# Patient Record
Sex: MALE | Race: WHITE | NOT HISPANIC OR LATINO | URBAN - METROPOLITAN AREA
[De-identification: names, ages, dates, MRNs, and addresses within clinical notes are randomized per-mention and may not be internally consistent; named-entity substitution may affect disease eponyms.]

---

## 2017-01-01 ENCOUNTER — HOME CARE/HOSPICE - HEALTHEAST (OUTPATIENT)
Dept: HOSPICE | Facility: HOSPICE | Age: 82
End: 2017-01-01

## 2017-01-01 ENCOUNTER — OFFICE VISIT - HEALTHEAST (OUTPATIENT)
Dept: GERIATRICS | Facility: CLINIC | Age: 82
End: 2017-01-01

## 2017-01-01 ENCOUNTER — AMBULATORY - HEALTHEAST (OUTPATIENT)
Dept: HOSPICE | Facility: HOSPICE | Age: 82
End: 2017-01-01

## 2017-01-01 ENCOUNTER — AMBULATORY - HEALTHEAST (OUTPATIENT)
Dept: FAMILY MEDICINE | Facility: CLINIC | Age: 82
End: 2017-01-01

## 2017-01-01 DIAGNOSIS — C34.12 MALIGNANT NEOPLASM OF UPPER LOBE OF LEFT LUNG (H): ICD-10-CM

## 2017-01-01 DIAGNOSIS — G30.9 MIXED ALZHEIMER'S AND VASCULAR DEMENTIA (H): ICD-10-CM

## 2017-01-01 DIAGNOSIS — G30.9 ALZHEIMER'S DISEASE (H): ICD-10-CM

## 2017-01-01 DIAGNOSIS — C43.70: ICD-10-CM

## 2017-01-01 DIAGNOSIS — I10 ESSENTIAL HYPERTENSION: ICD-10-CM

## 2017-01-01 DIAGNOSIS — I15.0 RENOVASCULAR HYPERTENSION WITH GOAL BLOOD PRESSURE LESS THAN 140/90: ICD-10-CM

## 2017-01-01 DIAGNOSIS — Z51.5 HOSPICE CARE PATIENT: ICD-10-CM

## 2017-01-01 DIAGNOSIS — F02.80 ALZHEIMER'S DISEASE (H): ICD-10-CM

## 2017-01-01 DIAGNOSIS — F02.80 MIXED ALZHEIMER'S AND VASCULAR DEMENTIA (H): ICD-10-CM

## 2017-01-01 DIAGNOSIS — K21.9 GASTROESOPHAGEAL REFLUX DISEASE WITHOUT ESOPHAGITIS: ICD-10-CM

## 2017-01-01 DIAGNOSIS — I67.9 CEREBROVASCULAR DISEASE: ICD-10-CM

## 2017-01-01 DIAGNOSIS — H40.1190 PRIMARY OPEN ANGLE GLAUCOMA, UNSPECIFIED GLAUCOMA STAGE, UNSPECIFIED LATERALITY: ICD-10-CM

## 2017-01-01 DIAGNOSIS — G81.90 HEMIPLEGIA (H): ICD-10-CM

## 2017-01-01 DIAGNOSIS — F01.50 MIXED ALZHEIMER'S AND VASCULAR DEMENTIA (H): ICD-10-CM

## 2017-01-01 DIAGNOSIS — E55.9 VITAMIN D DEFICIENCY: ICD-10-CM

## 2017-01-01 DIAGNOSIS — C61 MALIGNANT NEOPLASM OF PROSTATE (H): ICD-10-CM

## 2017-01-01 DIAGNOSIS — H35.30 MACULAR DEGENERATION (SENILE) OF RETINA: ICD-10-CM

## 2017-04-06 ENCOUNTER — HOME CARE/HOSPICE - HEALTHEAST (OUTPATIENT)
Dept: HOSPICE | Facility: HOSPICE | Age: 82
End: 2017-04-06

## 2017-04-06 ENCOUNTER — AMBULATORY - HEALTHEAST (OUTPATIENT)
Dept: GERIATRICS | Facility: CLINIC | Age: 82
End: 2017-04-06

## 2021-05-30 VITALS — WEIGHT: 194 LBS | BODY MASS INDEX: 27.84 KG/M2

## 2021-05-30 VITALS — WEIGHT: 192 LBS | BODY MASS INDEX: 27.55 KG/M2

## 2021-05-30 VITALS — WEIGHT: 189 LBS | BODY MASS INDEX: 27.12 KG/M2

## 2021-05-30 VITALS — WEIGHT: 164 LBS | BODY MASS INDEX: 23.53 KG/M2

## 2021-06-08 NOTE — PROGRESS NOTES
[facility]    Facility:   [facility] [place] [[id number]]   Code Status: DNR/DNI      CHIEF COMPLAINT/REASON FOR VISIT:  Chief Complaint   Patient presents with     Review Of Multiple Medical Conditions       HISTORY:      HPI: Gustavo is a 89 y.o. male seen today for a routine follow up and address any concerns he or nursing may have. He is currently on hospice care. I met with him in his room. He had just come back from breakfast. He was pleasant and alert to self only. He denied any pain or SOB. It appears he os on scheduled tylenol, vicodin and dilaudid.  He was last seen by MD in October in which he was noted to have a prominent swelling at his right side lower neck extending slightly to his upper chest. The area was red and somewhat warm. Today there is no swelling or redness. When I tell patient I met him at this time he does not remember me or anything about the facial swelling.  He had no concerns and neither did nursing.    Past Medical History   Diagnosis Date     Abnormality of gait 10/9/2012     Overview:  Had a fall in Spring 2011 while living in [place], [place] and sustained a huge subdural hematoma L side/ closed head injury. Since that time patient was dx'd with dementia and has very poor STM. Subsequently developed pneumonia and had a series of TIA's.      Alzheimer's disease 10/9/2012     Atherosclerosis of renal artery 10/9/2012     Overview:  L stent placed 1/2009.       Cerebrovascular disease 10/9/2012     Overview:  Series of TIA's after his traumatic fall and subdural hematoma/ closed head injury in 2011.       Chronic kidney disease, stage III (moderate) 6/13/2014     DNR (do not resuscitate) 10/3/2012     Overview:  DNR/DNI. NP reviewed with dtr Ruth/healthcare POA on 5/13/14.       Esophageal reflux 2/11/2015     Essential hypertension 10/9/2012     Hearing loss 10/9/2012     Hemiplegia 10/9/2012     Overview:  Subtle with TIA.      Macular degeneration  (senile) of retina 10/10/2014     Malignant melanoma of skin of lower limb, including hip 10/9/2012     Overview:  Removed on anterior calf.       Malignant neoplasm of prostate 10/9/2012     Overview:  Hx. S/p prostatectomy 1998.      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 Details unclear, but does have a [place]o filter.       Transient ischemic attack (TIA), and cerebral infarction without residual deficits 10/9/2012     Overview:  Series of TIA's per daughter.       Vitamin B12 deficiency anemia 10/9/2012     Vitamin D deficiency 10/9/2012             No family history on file.  Social History     Social History     Marital status:      Spouse name: N/A     Number of children: N/A     Years of education: N/A     Occupational History           Social History Main Topics     Smoking status: Former Smoker     Smokeless tobacco: Not on file     Alcohol use Not on file     Drug use: Not on file     Sexual activity: Not on file     Other Topics Concern     Not on file     Social History Narrative    Lives in an assisted living facility.       Limited ROS- alert to self only  Review of Systems   Respiratory: Negative for shortness of breath.    Cardiovascular: Negative for chest pain.   Gastrointestinal: Negative for abdominal pain.   Genitourinary: Negative for dysuria.   Musculoskeletal: Negative for arthralgias and neck pain.   Psychiatric/Behavioral: Negative for sleep disturbance.       .  Vitals:    01/06/17 1450   BP: 156/67   Pulse: 81   Temp: 97.9  F (36.6  C)   SpO2: 96%   Weight: 192 lb (87.1 kg)       Physical Exam   Constitutional: He appears well-nourished.   HENT:   Head: Normocephalic.   Eyes: Conjunctivae are normal.   Neck: Normal range of motion.   Cardiovascular: Normal rate and  regular rhythm.    Pulmonary/Chest: Breath sounds normal. He has no wheezes. He has no rales.   Abdominal: Soft. Bowel sounds are normal. He exhibits distension.   Musculoskeletal: He exhibits no edema.   Neurological: He is alert.   Skin: Skin is warm and dry.   Old incision left shin   Psychiatric: His behavior is normal.   Alert to self         LABS:   Reviewed in Epic    ASSESSMENT:      ICD-10-CM    1. Alzheimer's disease G30.9    2. Malignant neoplasm of upper lobe of left lung C34.12    3. Hospice care patient Z51.5    4. Gastroesophageal reflux disease without esophagitis K21.9    5. Essential hypertension I10        PLAN:    Pt currently on hospice, Denies any pain, sob. Continue to provide a safe environment. Pt eating well and using a wheelchair to get around the unit. No new orders or changes to care plan at this time.    Total 25 minutes of which 50% was spent counseling and coordination of care of the above plan.    Electronically signed by: Marie Yu CNP

## 2021-06-09 NOTE — PROGRESS NOTES
HOSPICE NP FACE TO FACE VISIT ON 3/31/2017 AT [facility] IN [place], [place] with WILIAM HECTOR-HOSPICE RN CASE MANAGER PRESENT:    Summary: Gustavo Fish is a 89 y.o.  male on hospice for a primary diagnosis of lung cancer. The original referral was in October of 2016 following an ER visit for neck swelling that had interfered with eating. The cancer was diagnosed and a neck thrombus was also discovered after several tests. He also had pneumonia earlier in the same year.     Significant comorbidities/coexisting conditions include:  Alzheimer's disease  Chronic kidney disease stage 3  Hx thromboembolism of right jugular vein  Hx subdural hematoma  Hypertension  Atherosclerosis of renal artery  Hx TIAs  Hx prostate cancer  Vitamin D deficiency  Vitamin B12 deficiency  GERD  Macular degeneration  Open angle glaucoma  Hx malignant melanoma    Current Outpatient Prescriptions   Medication Sig Dispense Refill     acetaminophen (TYLENOL) 325 MG tablet Take 650 mg by mouth 3 (three) times a day. 8:30AM, 4PM, 8PM       aluminum-magnesium hydroxide-simethicone 200-200-20 mg/5 mL Susp Take 30 mL by mouth every 4 (four) hours as needed.       amLODIPine (NORVASC) 10 MG tablet Take 10 mg by mouth daily.       aspirin 325 MG EC tablet Take 325 mg by mouth daily.       atropine 1 % ophthalmic solution Place 2 drops under the tongue every 4 (four) hours as needed (secretions). Indications: secretions       bisacodyl (DULCOLAX) 10 mg suppository Insert 10 mg into the rectum daily as needed.       escitalopram oxalate (LEXAPRO) 5 MG tablet Take 5 mg by mouth daily.       HYDROcodone-acetaminophen 5-325 mg per tablet Take 1 tablet by mouth 3 (three) times a day. give 1 tablet by mouth 3x daily scheduled.   Indications: Pain       HYDROmorphone (DILAUDID) 2 MG tablet Take 2 mg by mouth every 8 (eight) hours. sol tab-   Indications: Pain, dyspnea       HYDROmorphone (DILAUDID) 2 MG tablet Take 2 mg by mouth every 2 (two) hours  as needed for pain. administer 1 tablet sublingual every 2 hours as needed for pain/ dyspnea.  Indications: Pain, dyspnea       LORazepam (ATIVAN) 0.5 MG tablet Take 0.5 mg by mouth every 4 (four) hours as needed for anxiety. Indications: Anxiety       magnesium hydroxide (MAGNESIUM HYDROXIDE) 400 mg/5 mL Susp suspension Take 30 mL by mouth daily as needed.       polyethylene glycol (MIRALAX) 17 gram packet Take 17 g by mouth daily. Indications: Constipation       ranitidine (ZANTAC) 75 MG tablet Take 75 mg by mouth 2 (two) times a day. 8:30AM, 4PM       SENNOSIDES/DOCUSATE SODIUM (SENNA-S ORAL) Take 1 tablet by mouth 2 (two) times a day. per he hospice admission orders  Indications: constipation       tamsulosin (FLOMAX) 0.4 mg Cp24 Take 0.4 mg by mouth Daily after breakfast.        travoprost (TRAVATAN Z) 0.004 % Drop ophthalmic drops Administer 1 drop to both eyes bedtime.       No current facility-administered medications for this visit.      Functional status: Gustavo is non-ambulatory, minimally weightbearing-only for transfers but he requires strong assistance.  Cognitively he is unable to report for himself. He can sometimes use a urinal but also has incontinence of bowel and bladder. His appetite has been very poor, and he is losing weight.  He needs assistance with all cares and has recently started on oxygen for shortness of breath. He is visually impaired. He resides in the nursing home in [facility].    Current symptoms: Gustavo denies any problems, but he has a poor historian and has dementia.  According to staff he has started having trouble with dysphagia, is coughing with eating and drinking, and has appeared short of breath at times.     Gunlock Scale Symptom Assessment  Pain-Denies  Tiredness-Yes  Drowsiness-Yes  Nausea-Denies  Lack of appetite-Yes  Shortness of breath-Yes, started on oxygen a few days ago  Depression-No  Anxiety-No  Weakness-Yes    Changes in condition: Gustavo has had a profound weight  loss, has a new area of swelling in the right hand, he is increasingly short of breath and has needed to have oxygen started. He also appears to be aspirating which is new.     Review of systems:   Constitutional: Positive for significant weight loss and fatigue.  Neurological: Negative for seizures, positive for dementia.  Skin: Negative for rashes, bruises or pressure ulcers.   Respiratory: Positive for shortness of breath, coughing with eating and drinking. Recent low oxygen levels, started on oxygen.  Cardiovascular: Negative for chest pain.   Gastrointestinal: Positive for poor appetite.    Musculoskeletal: Positive for muscle weakness.   Genitourinary: Negative.     Physical examination:     VS: /85, HR 81, RR 16, SaO2 85% RA. Right mid arm circumference 25.5cm. PPS 40%. Weight:164# (down from 184# at start of care-had been 190# in May 2016.)      General: Elderly man of medium size seated in a soft chair in his room.   Neurological: Roused to stimulation, followed simple commands, unable to give a report, pupils are equal. Disoriented to time and place.  HEENT: Normocephalic, atraumatic, neck supple, mucous membranes dry.  Has poor dentition.   Skin: Pale, cool, dry.   Respiratory: Decreased in the right lung fields, sounds clear on the left.  Choked after drinking water, face turned red and he coughed for several minutes suggesting aspiration.  Cardiovascular: Bounding S1-S2, regular, no murmurs appreciated.  Abdomen: Soft, large ventral hernia noted, bowel sounds regular.  Extremities: Has 2+ pitting edema in the right and left ankle, mild generalized muscle wasting, has a scar in the left shin, otherwise unremarkable.    Based on weight loss, signs of aspiration, low oxygen levels and decreased appetite Gustavo is declining.     Kenzie Price, APRN/CNP  [facility]

## 2021-06-09 NOTE — PROGRESS NOTES
[facility]    DATE: 2017    NAME: Gustavo Fish  : 1924           MR# [id number]     CODE STATUS:  DNR/DNI and Family request no hospitalization ,ER,Invasive procedures      VISIT TYPE: Problem   FACILITY: [facility] [[id number]]    ROOM: 401    PRIMARY CARE PROVIDER: Marie Yu CNP Phone: [phone] Fax:[phone]    History of Present Illness:   Gustavo Fish is a 89 y.o. male with Progressive dementia and a presumed malignant neoplasm of the upper lobe of the left lung. He is currently o hospice and comfort care in today especially seems drowsy and confused. His oxygen is at 4 L but is oximetry runs in the high 80s. He is not coherent in his answers to my questions but nursing service reports is not much different than usual. I have reviewed his record to form my opinion on his course although his responses are not in any way relevant verbally    Past Medical History:  Past Medical History:   Diagnosis Date     Abnormality of gait 10/9/2012    Overview:  Had a fall in Spring 2011 while living in [place], [place] and sustained a huge subdural hematoma L side/ closed head injury. Since that time patient was dx'd with dementia and has very poor STM. Subsequently developed pneumonia and had a series of TIA's.      Alzheimer's disease 10/9/2012     Atherosclerosis of renal artery 10/9/2012    Overview:  L stent placed 2009.       Cerebrovascular disease 10/9/2012    Overview:  Series of TIA's after his traumatic fall and subdural hematoma/ closed head injury in .       Chronic kidney disease, stage III (moderate) 2014     DNR (do not resuscitate) 10/3/2012    Overview:  DNR/DNI. NP reviewed with dtr Ruth/healthcare POA on 14.       Esophageal reflux 2015     Essential hypertension 10/9/2012     Hearing loss 10/9/2012     Hemiplegia 10/9/2012    Overview:  Subtle with TIA.      Macular degeneration (senile) of retina 10/10/2014      Malignant melanoma of skin of lower limb, including hip 10/9/2012    Overview:  Removed on anterior calf.       Malignant neoplasm of prostate 10/9/2012    Overview:  Hx. S/p prostatectomy 1998.      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 Details unclear, but does have a [place] filter.       Transient ischemic attack (TIA), and cerebral infarction without residual deficits 10/9/2012    Overview:  Series of TIA's per daughter.       Vitamin B12 deficiency anemia 10/9/2012     Vitamin D deficiency 10/9/2012       Allergies:  No Known Allergies    Current Medications:  Current Outpatient Prescriptions   Medication Sig     acetaminophen (TYLENOL) 325 MG tablet Take 650 mg by mouth 3 (three) times a day. 8:30AM, 4PM, 8PM     aluminum-magnesium hydroxide-simethicone 200-200-20 mg/5 mL Susp Take 30 mL by mouth every 4 (four) hours as needed.     amLODIPine (NORVASC) 10 MG tablet Take 10 mg by mouth daily.     aspirin 325 MG EC tablet Take 325 mg by mouth daily.     atropine 1 % ophthalmic solution Place 2 drops under the tongue every 4 (four) hours as needed (secretions). Indications: secretions     bisacodyl (DULCOLAX) 10 mg suppository Insert 10 mg into the rectum daily as needed.     escitalopram oxalate (LEXAPRO) 5 MG tablet Take 5 mg by mouth daily.     HYDROcodone-acetaminophen 5-325 mg per tablet Take 1 tablet by mouth 3 (three) times a day. give 1 tablet by mouth 3x daily scheduled.   Indications: Pain     HYDROmorphone (DILAUDID) 2 MG tablet Take 2 mg by mouth every 8 (eight) hours. sol tab-   Indications: Pain, dyspnea     HYDROmorphone (DILAUDID) 2 MG tablet Take 2 mg by mouth every 2 (two) hours as needed for pain. administer 1 tablet sublingual every 2 hours as needed for pain/ dyspnea.  Indications: Pain,  dyspnea     LORazepam (ATIVAN) 0.5 MG tablet Take 0.5 mg by mouth every 4 (four) hours as needed for anxiety. Indications: Anxiety     magnesium hydroxide (MAGNESIUM HYDROXIDE) 400 mg/5 mL Susp suspension Take 30 mL by mouth daily as needed.     nystatin (MYCOSTATIN) powder Apply 1 application topically 2 (two) times a day as needed.     polyethylene glycol (MIRALAX) 17 gram packet Take 17 g by mouth daily. Indications: Constipation     ranitidine (ZANTAC) 75 MG tablet Take 75 mg by mouth 2 (two) times a day. 8:30AM, 4PM     SENNOSIDES/DOCUSATE SODIUM (SENNA-S ORAL) Take 1 tablet by mouth 2 (two) times a day. per he hospice admission orders  Indications: constipation     tamsulosin (FLOMAX) 0.4 mg Cp24 Take 0.4 mg by mouth Daily after breakfast.      travoprost (TRAVATAN Z) 0.004 % Drop ophthalmic drops Administer 1 drop to both eyes bedtime.       Review of Systems:  History obtained from chart review and unobtainable from patient due to mental status  General ROS: negative for - chills or fever  Psychological ROS: positive for - concentration difficulties, disorientation and memory difficulties  Ophthalmic ROS: negative for - decreased vision or loss of vision  ENT ROS: negative for - nasal discharge  Respiratory ROS: He has chronic cough and congestion  Cardiovascular ROS: He shows dyspnea at rest  Gastrointestinal ROS: no abdominal pain, change in bowel habits, or black or bloody stools  Genito-Urinary ROS: no dysuria, trouble voiding, or hematuria  Musculoskeletal ROS: He is globally diminished and strength but sitting comfortably in the chair  Neurological ROS: no TIA or stroke symptoms  Dermatological ROS: I observe a nursing service report no open skin lesions         Laboratory:  No results for input(s): INR in the last 72 hours.  None    Physical Examination:  BP (!) 77/57  Pulse 73  Temp 97.7  F (36.5  C)  Wt 189 lb (85.7 kg)  SpO2 (!) 87% Comment: 4 liters  BMI 27.12 kg/m2  General appearance:  alert, appears stated age, cooperative, distracted, flushed, no distress, mildly obese and slowed mentation  Head: Normocephalic, without obvious abnormality, atraumatic  Eyes: conjunctivae/corneas clear. PERRL, EOM's intact. Fundi benign.  Neck: no adenopathy, no carotid bruit, no JVD, supple, symmetrical, trachea midline and thyroid not enlarged, symmetric, no tenderness/mass/nodules  Lungs: bilateral wheezes and rhonchi  Heart: regular rate and rhythm, S1, S2 normal, no murmur, click, rub or gallop  Abdomen: soft, non-tender; bowel sounds normal; no masses,  no organomegaly  Extremities: mild peripheral edema especially interestingly enough in his left hand  Pulses: 2+ and symmetric  Skin: Skin color, texture, turgor normal. No rashes or lesions  Neurologic: Mental status: incoherent response to questions although he looks at me and tries to respond  Motor:globally diminished strength with no particular localization although as noted above his left hand is more edematous it seems to have similar strength and range of motion compared to the right       Impression:  Gustavo Fish is a 89 y.o. male with Progressive dementia and presumed lung cancer    1. Mixed Alzheimer's and vascular dementia     2. Cerebrovascular disease     3. Malignant neoplasm of prostate     4. Malignant neoplasm of upper lobe of left lung     5. Renovascular hypertension with goal blood pressure less than 140/90     6. Primary open angle glaucoma, unspecified glaucoma stage, unspecified laterality     7. Gastroesophageal reflux disease without esophagitis     8. Vitamin D deficiency     9. Hemiplegia     10. Macular degeneration (senile) of retina     11. Malignant melanoma of skin of lower limb, including hip, unspecified laterality         Plan: Will continue to provide hospice care as requested by family and presumably the patient when he was coherent    Electronically signed by: Joe Guo Sr., MD